# Patient Record
Sex: MALE | Race: BLACK OR AFRICAN AMERICAN | NOT HISPANIC OR LATINO
[De-identification: names, ages, dates, MRNs, and addresses within clinical notes are randomized per-mention and may not be internally consistent; named-entity substitution may affect disease eponyms.]

---

## 2024-04-10 PROBLEM — Z00.00 ENCOUNTER FOR PREVENTIVE HEALTH EXAMINATION: Status: ACTIVE | Noted: 2024-04-10

## 2024-04-11 ENCOUNTER — APPOINTMENT (OUTPATIENT)
Dept: UROLOGY | Facility: CLINIC | Age: 39
End: 2024-04-11

## 2024-04-24 ENCOUNTER — APPOINTMENT (OUTPATIENT)
Dept: OTOLARYNGOLOGY | Facility: CLINIC | Age: 39
End: 2024-04-24

## 2024-04-24 ENCOUNTER — APPOINTMENT (OUTPATIENT)
Dept: UROLOGY | Facility: CLINIC | Age: 39
End: 2024-04-24

## 2024-05-20 ENCOUNTER — APPOINTMENT (OUTPATIENT)
Dept: UROLOGY | Facility: CLINIC | Age: 39
End: 2024-05-20
Payer: MEDICAID

## 2024-05-20 ENCOUNTER — LABORATORY RESULT (OUTPATIENT)
Age: 39
End: 2024-05-20

## 2024-05-20 DIAGNOSIS — Z78.9 OTHER SPECIFIED HEALTH STATUS: ICD-10-CM

## 2024-05-20 DIAGNOSIS — K76.0 FATTY (CHANGE OF) LIVER, NOT ELSEWHERE CLASSIFIED: ICD-10-CM

## 2024-05-20 DIAGNOSIS — Z86.39 PERSONAL HISTORY OF OTHER ENDOCRINE, NUTRITIONAL AND METABOLIC DISEASE: ICD-10-CM

## 2024-05-20 DIAGNOSIS — R03.0 ELEVATED BLOOD-PRESSURE READING, W/OUT DIAGNOSIS OF HYPERTENSION: ICD-10-CM

## 2024-05-20 DIAGNOSIS — N62 HYPERTROPHY OF BREAST: ICD-10-CM

## 2024-05-20 DIAGNOSIS — N44.2 BENIGN CYST OF TESTIS: ICD-10-CM

## 2024-05-20 PROCEDURE — 99204 OFFICE O/P NEW MOD 45 MIN: CPT

## 2024-05-20 PROCEDURE — 76870 US EXAM SCROTUM: CPT

## 2024-05-20 RX ORDER — ATORVASTATIN CALCIUM 80 MG/1
TABLET, FILM COATED ORAL
Refills: 0 | Status: ACTIVE | COMMUNITY

## 2024-05-20 NOTE — ASSESSMENT
[FreeTextEntry1] : Mr. Jose Guadalupe Burgos is a 38-year-old  was seen in the emergency room in March 2024 complaining of left testicular pressure.  He states that he is aware of a epididymal cyst.  He was feeling more discomfort on the left side and was seen by urgent care and referred to the emergency room to rule out testicular torsion.  Ultrasound demonstrates a 0.5 cm cyst in the left testicle and the right epididymal cyst.  He is relatively asymptomatic but aware of the cyst.  He has not taking any medication.  USG demonstrates  left tunica cyst at caput and right epididymal cyst no testicular masses no varicocele  We discussed conservative management with: a.  nonsteroidal anti-inflammatories, b. ice prn and c. scrotal support.  He has bilateral gynecomastia ; life long; no change; not symptomatic referral Bernard Peralta [phone]  Plan tumor markers endrocrine referral re gynecomastia fu in 3 months

## 2024-05-20 NOTE — PHYSICAL EXAM
[General Appearance - Well Developed] : well developed [General Appearance - Well Nourished] : well nourished [Abdomen Soft] : soft [Abdomen Tenderness] : non-tender [] : no hepato-splenomegaly [Abdomen Hernia] : no hernia was discovered [Urethral Meatus] : meatus normal [Penis Abnormality] : normal circumcised penis [Testes Tenderness] : no tenderness of the testes [Testes Mass (___cm)] : there were no testicular masses [de-identified] : bilateral gynecomastia [de-identified] : left testicle upper pole lateral epididymal appendix; right caput epdidymal cyst; no varicocele; no masses;  MICHELLE BRIGGS was offered to have a chaperone present  and declined.

## 2024-05-20 NOTE — HISTORY OF PRESENT ILLNESS
[FreeTextEntry1] : 38 YEAR OLD   seen in emergency room [facility] in 3/2024 complaining of testiciular cyst seen because of pressure in testicle USG testicular cyst (left 0.05 cms and right epididymal cyst

## 2024-05-21 ENCOUNTER — NON-APPOINTMENT (OUTPATIENT)
Age: 39
End: 2024-05-21

## 2024-05-21 LAB
AFP-TM SERPL-MCNC: <1.8 NG/ML
ESTRADIOL SERPL-MCNC: 22 PG/ML
HCG-TM SERPL-MCNC: <1 MIU/ML
LDH SERPL-CCNC: 191 U/L
PROLACTIN SERPL-MCNC: 12.7 NG/ML

## 2024-05-22 DIAGNOSIS — R79.89 OTHER SPECIFIED ABNORMAL FINDINGS OF BLOOD CHEMISTRY: ICD-10-CM

## 2024-06-03 ENCOUNTER — TRANSCRIPTION ENCOUNTER (OUTPATIENT)
Age: 39
End: 2024-06-03

## 2024-06-07 ENCOUNTER — NON-APPOINTMENT (OUTPATIENT)
Age: 39
End: 2024-06-07

## 2024-06-10 ENCOUNTER — NON-APPOINTMENT (OUTPATIENT)
Age: 39
End: 2024-06-10

## 2024-06-18 ENCOUNTER — APPOINTMENT (OUTPATIENT)
Dept: BREAST CENTER | Facility: CLINIC | Age: 39
End: 2024-06-18

## 2024-08-19 ENCOUNTER — APPOINTMENT (OUTPATIENT)
Dept: UROLOGY | Facility: CLINIC | Age: 39
End: 2024-08-19
Payer: MEDICAID

## 2024-08-19 ENCOUNTER — LABORATORY RESULT (OUTPATIENT)
Age: 39
End: 2024-08-19

## 2024-08-19 DIAGNOSIS — N62 HYPERTROPHY OF BREAST: ICD-10-CM

## 2024-08-19 DIAGNOSIS — R79.89 OTHER SPECIFIED ABNORMAL FINDINGS OF BLOOD CHEMISTRY: ICD-10-CM

## 2024-08-19 DIAGNOSIS — N44.2 BENIGN CYST OF TESTIS: ICD-10-CM

## 2024-08-19 PROCEDURE — 99214 OFFICE O/P EST MOD 30 MIN: CPT

## 2024-08-19 PROCEDURE — 76870 US EXAM SCROTUM: CPT

## 2024-08-19 NOTE — PHYSICAL EXAM
[General Appearance - Well Developed] : well developed [General Appearance - Well Nourished] : well nourished [Abdomen Soft] : soft [Abdomen Tenderness] : non-tender [] : no hepato-splenomegaly [Abdomen Hernia] : no hernia was discovered [Urethral Meatus] : meatus normal [Penis Abnormality] : normal circumcised penis [Testes Tenderness] : no tenderness of the testes [Testes Mass (___cm)] : there were no testicular masses [de-identified] : bilateral gynecomastia [de-identified] : left testicle upper pole lateral epididymal appendix; right caput epdidymal cyst; no varicocele; no masses;  MICHELLE BRIGGS was offered to have a chaperone present  and declined.

## 2024-08-19 NOTE — HISTORY OF PRESENT ILLNESS
[FreeTextEntry1] : 38 YEAR OLD   seen in emergency room [facility] in 3/2024 complaining of testiciular cyst seen because of pressure in testicle USG testicular cyst (left 0.05 cms and right epididymal cyst  8/19/24 Patient comes in today for a urologic follow-up for testicular pain, low testosterone levels, and epididmyal cyst  Patient state's sexual function (erection) is normal although infrequently sexually active Patient states having has insomnia and a poor diet States he has low energy

## 2024-08-19 NOTE — ASSESSMENT
[FreeTextEntry1] : Mr. Jose Guadalupe Burgos is a 38-year-old  was seen in the emergency room in March 2024 complaining of left testicular pressure.  He states that he is aware of a epididymal cyst.  He was feeling more discomfort on the left side and was seen by urgent care and referred to the emergency room to rule out testicular torsion.  Ultrasound demonstrates a 0.5 cm cyst in the left testicle and the right epididymal cyst.  He is relatively asymptomatic but aware of the cyst.  He has not taking any medication.  USG demonstrates  left tunica cyst at caput and right epididymal cyst no testicular masses no varicocele  We discussed conservative management with: a.  nonsteroidal anti-inflammatories, b. ice prn and c. scrotal support.  He has bilateral gynecomastia ; life long; no change; not symptomatic referral Bernard Peralta [phone]  Plan tumor markers endrocrine referral re gynecomastia fu in 3 months   8/19/2024  Patient returns.  He is having no further testicular pain.  We discussed proceeding with a follow-up ultrasound to be certain there is no change in his testicular cyst.  This was performed.  There is no change.  We discussed his previous testosterone level.  This was low.  He did not repeat this.  We discussed further endocrinologic evaluation.  We discussed the potential testosterone supplementation if repeat testosterone levels are in fact low.  We discussed the fact that this would affect spermatogenesis.  He is interested in future fertility.  We discussed the potential utilization of clomiphene citrate as an alternative.  He has not followed up on gynecomastia  Plan: Testosterone Prolactin LH FSH If hormone levels are abnormal he will submit  a semen analysis and then a follow-up to discuss options

## 2024-08-19 NOTE — PHYSICAL EXAM
[General Appearance - Well Developed] : well developed [General Appearance - Well Nourished] : well nourished [Abdomen Soft] : soft [Abdomen Tenderness] : non-tender [] : no hepato-splenomegaly [Abdomen Hernia] : no hernia was discovered [Urethral Meatus] : meatus normal [Penis Abnormality] : normal circumcised penis [Testes Tenderness] : no tenderness of the testes [Testes Mass (___cm)] : there were no testicular masses [de-identified] : bilateral gynecomastia [de-identified] : left testicle upper pole lateral epididymal appendix; right caput epdidymal cyst; no varicocele; no masses;  MICHELLE BRIGGS was offered to have a chaperone present  and declined.

## 2024-08-19 NOTE — ASSESSMENT
[FreeTextEntry1] : Mr. Jose Guadalupe Burgos is a 38-year-old  was seen in the emergency room in March 2024 complaining of left testicular pressure.  He states that he is aware of a epididymal cyst.  He was feeling more discomfort on the left side and was seen by urgent care and referred to the emergency room to rule out testicular torsion.  Ultrasound demonstrates a 0.5 cm cyst in the left testicle and the right epididymal cyst.  He is relatively asymptomatic but aware of the cyst.  He has not taking any medication.  USG demonstrates  left tunica cyst at caput and right epididymal cyst no testicular masses no varicocele  We discussed conservative management with: a.  nonsteroidal anti-inflammatories, b. ice prn and c. scrotal support.  He has bilateral gynecomastia ; life long; no change; not symptomatic referral Bernard Peralta [phone]  Plan tumor markers endrocrine referral re gynecomastia fu in 3 months   8/19/2024  Patient returns.  He is having no further testicular pain.  We discussed proceeding with a follow-up ultrasound to be certain there is no change in his testicular cyst.  This was performed.  There is no change.  We discussed his previous testosterone level.  This was low.  He did not repeat this.  We discussed further endocrinologic evaluation.  We discussed the potential testosterone supplementation if repeat testosterone levels are in fact low.  We discussed the fact that this would affect spermatogenesis.  He is interested in future fertility.  We discussed the potential utilization of clomiphene citrate as an alternative.  He has not followed up on gynecomastia  Plan: Testosterone Prolactin LH FSH If hormone levels are abnormal he will submit  a semen analysis and then a follow-up to discuss options

## 2024-08-19 NOTE — HISTORY OF PRESENT ILLNESS
[FreeTextEntry1] : 38 YEAR OLD   seen in emergency room [facility] in 3/2024 complaining of testiciular cyst seen because of pressure in testicle USG testicular cyst (left 0.05 cms and right epididymal cyst  8/19/24 Patient comes in today for a urologic follow-up for testicular pain, low testosterone levels, and epididmyal cyst  Patient state's sexual function (erection) is normal although infrequently sexually active Patient states having has insomnia and a poor diet States he has low energy

## 2024-08-20 LAB
FSH SERPL-MCNC: 5 IU/L
LH SERPL-ACNC: 5.9 IU/L
PROLACTIN SERPL-MCNC: 19.9 NG/ML
TSH SERPL-ACNC: 6.74 UIU/ML

## 2024-08-22 ENCOUNTER — TRANSCRIPTION ENCOUNTER (OUTPATIENT)
Age: 39
End: 2024-08-22

## 2024-08-22 LAB
TESTOST FREE SERPL-MCNC: 1.7 PG/ML
TESTOST SERPL-MCNC: 175 NG/DL

## 2024-10-11 NOTE — REVIEW OF SYSTEMS
[Negative] : Heme/Lymph
[Negative] : Heme/Lymph
Please feel free to contact us at [phone] if any problems arise. After 6PM, Monday through Friday, on weekends and on holidays, please call [phone] and ask for the radiology resident on call to be paged.

## 2025-01-30 ENCOUNTER — APPOINTMENT (OUTPATIENT)
Dept: UROLOGY | Facility: CLINIC | Age: 40
End: 2025-01-30
Payer: MEDICAID

## 2025-01-30 ENCOUNTER — NON-APPOINTMENT (OUTPATIENT)
Age: 40
End: 2025-01-30

## 2025-01-30 VITALS
SYSTOLIC BLOOD PRESSURE: 144 MMHG | OXYGEN SATURATION: 98 % | HEART RATE: 63 BPM | DIASTOLIC BLOOD PRESSURE: 98 MMHG | TEMPERATURE: 94.4 F

## 2025-01-30 DIAGNOSIS — R30.0 DYSURIA: ICD-10-CM

## 2025-01-30 DIAGNOSIS — R79.89 OTHER SPECIFIED ABNORMAL FINDINGS OF BLOOD CHEMISTRY: ICD-10-CM

## 2025-01-30 PROCEDURE — 99214 OFFICE O/P EST MOD 30 MIN: CPT

## 2025-01-31 ENCOUNTER — TRANSCRIPTION ENCOUNTER (OUTPATIENT)
Age: 40
End: 2025-01-31

## 2025-01-31 LAB
HCT VFR BLD CALC: 44.6 %
HGB BLD-MCNC: 14.2 G/DL
PROLACTIN SERPL-MCNC: 22.5 NG/ML
SHBG SERPL-SCNC: 23 NMOL/L
TESTOST SERPL-MCNC: 243 NG/DL
TSH SERPL-ACNC: 3.28 UIU/ML

## 2025-02-24 ENCOUNTER — APPOINTMENT (OUTPATIENT)
Dept: UROLOGY | Facility: CLINIC | Age: 40
End: 2025-02-24